# Patient Record
Sex: FEMALE | Race: ASIAN | Employment: UNEMPLOYED | URBAN - METROPOLITAN AREA
[De-identification: names, ages, dates, MRNs, and addresses within clinical notes are randomized per-mention and may not be internally consistent; named-entity substitution may affect disease eponyms.]

---

## 2017-08-04 PROCEDURE — 82607 VITAMIN B-12: CPT | Performed by: INTERNAL MEDICINE

## 2017-08-04 PROCEDURE — 88175 CYTOPATH C/V AUTO FLUID REDO: CPT | Performed by: INTERNAL MEDICINE

## 2018-01-09 PROCEDURE — 84238 ASSAY NONENDOCRINE RECEPTOR: CPT | Performed by: OTHER

## 2018-01-09 PROCEDURE — 83519 RIA NONANTIBODY: CPT | Performed by: OTHER

## 2018-01-09 PROCEDURE — 36415 COLL VENOUS BLD VENIPUNCTURE: CPT | Performed by: OTHER

## 2018-01-09 PROCEDURE — 86255 FLUORESCENT ANTIBODY SCREEN: CPT | Performed by: OTHER

## 2019-01-03 PROCEDURE — 87177 OVA AND PARASITES SMEARS: CPT | Performed by: INTERNAL MEDICINE

## 2019-01-03 PROCEDURE — 87329 GIARDIA AG IA: CPT | Performed by: INTERNAL MEDICINE

## 2019-01-03 PROCEDURE — 87209 SMEAR COMPLEX STAIN: CPT | Performed by: INTERNAL MEDICINE

## 2019-01-03 PROCEDURE — 87272 CRYPTOSPORIDIUM AG IF: CPT | Performed by: INTERNAL MEDICINE

## 2020-01-08 ENCOUNTER — OFFICE VISIT (OUTPATIENT)
Dept: OBGYN CLINIC | Facility: CLINIC | Age: 50
End: 2020-01-08
Payer: COMMERCIAL

## 2020-01-08 VITALS
WEIGHT: 156 LBS | HEIGHT: 63 IN | BODY MASS INDEX: 27.64 KG/M2 | DIASTOLIC BLOOD PRESSURE: 68 MMHG | SYSTOLIC BLOOD PRESSURE: 114 MMHG

## 2020-01-08 DIAGNOSIS — N94.11 SUPERFICIAL DYSPAREUNIA: Primary | ICD-10-CM

## 2020-01-08 DIAGNOSIS — N95.1 PERIMENOPAUSE: ICD-10-CM

## 2020-01-08 DIAGNOSIS — N92.6 IRREGULAR MENSES: ICD-10-CM

## 2020-01-08 DIAGNOSIS — Z12.4 CERVICAL CANCER SCREENING: ICD-10-CM

## 2020-01-08 PROCEDURE — 99203 OFFICE O/P NEW LOW 30 MIN: CPT | Performed by: NURSE PRACTITIONER

## 2020-01-08 NOTE — PROGRESS NOTES
Here for new gynecology visit. 52year old G 4 P 2. Patient's last menstrual period was 07/01/2019 (approximate). .     Here to inquire about a 2 year history of irregular menses and vaginal dryness leading to painful intercourse.  She started to see her m past.  Heart:  No chest pain, palpitations. Breasts:  No pain, lumps or secretions. GI:   No nausea, emesis, reflux, liver disease, GB problems, issues with diarrhea or constipation.   :   No urgency, frequency, VICTOR M, bladder problems in past.  Extremiti

## 2020-01-10 LAB — HPV MRNA E6/E7: NOT DETECTED

## 2020-01-27 ENCOUNTER — TELEPHONE (OUTPATIENT)
Dept: OBGYN CLINIC | Facility: CLINIC | Age: 50
End: 2020-01-27

## 2020-01-27 DIAGNOSIS — Z13.220 ENCOUNTER FOR SCREENING FOR LIPID DISORDER: ICD-10-CM

## 2020-01-27 DIAGNOSIS — N95.1 PERIMENOPAUSE: ICD-10-CM

## 2020-01-27 DIAGNOSIS — N92.6 IRREGULAR MENSES: Primary | ICD-10-CM

## 2020-01-27 NOTE — TELEPHONE ENCOUNTER
[facility] lab calling requesting orders that were placed by Eddie Bradley on 1/8 to [facility] to be reordered to [facility] lab. [facility] lab unable to see [facility] orders in [employer]. Pt is at [facility] lab now. Orders re entered.

## 2020-01-28 ENCOUNTER — APPOINTMENT (OUTPATIENT)
Dept: GENERAL RADIOLOGY | Facility: HOSPITAL | Age: 50
End: 2020-01-28
Attending: EMERGENCY MEDICINE
Payer: COMMERCIAL

## 2020-01-28 ENCOUNTER — TELEPHONE (OUTPATIENT)
Dept: OBGYN CLINIC | Facility: CLINIC | Age: 50
End: 2020-01-28

## 2020-01-28 ENCOUNTER — HOSPITAL ENCOUNTER (OUTPATIENT)
Facility: HOSPITAL | Age: 50
Setting detail: OBSERVATION
Discharge: HOME | End: 2020-01-29
Attending: EMERGENCY MEDICINE
Payer: COMMERCIAL

## 2020-01-28 DIAGNOSIS — R07.9 CHEST PAIN OF UNCERTAIN ETIOLOGY: Primary | ICD-10-CM

## 2020-01-28 PROBLEM — R79.89 AZOTEMIA: Status: ACTIVE | Noted: 2020-01-28

## 2020-01-28 PROBLEM — R73.9 HYPERGLYCEMIA: Status: ACTIVE | Noted: 2020-01-28

## 2020-01-28 LAB
ATRIAL RATE: 86 BPM
D-DIMER: 0.28 UG/ML FEU (ref ?–0.5)
GLUCOSE BLD-MCNC: 133 MG/DL (ref 70–99)
P AXIS: 55 DEGREES
P-R INTERVAL: 144 MS
Q-T INTERVAL: 376 MS
QRS DURATION: 86 MS
QTC CALCULATION (BEZET): 449 MS
R AXIS: 14 DEGREES
T AXIS: 55 DEGREES
TROPONIN I SERPL-MCNC: <0.045 NG/ML (ref ?–0.04)
VENTRICULAR RATE: 86 BPM

## 2020-01-28 PROCEDURE — 71045 X-RAY EXAM CHEST 1 VIEW: CPT | Performed by: EMERGENCY MEDICINE

## 2020-01-28 PROCEDURE — 99285 EMERGENCY DEPT VISIT HI MDM: CPT

## 2020-01-28 PROCEDURE — 84484 ASSAY OF TROPONIN QUANT: CPT | Performed by: HOSPITALIST

## 2020-01-28 PROCEDURE — 82962 GLUCOSE BLOOD TEST: CPT

## 2020-01-28 PROCEDURE — 93010 ELECTROCARDIOGRAM REPORT: CPT

## 2020-01-28 PROCEDURE — 36415 COLL VENOUS BLD VENIPUNCTURE: CPT

## 2020-01-28 PROCEDURE — 93005 ELECTROCARDIOGRAM TRACING: CPT

## 2020-01-28 PROCEDURE — 84484 ASSAY OF TROPONIN QUANT: CPT | Performed by: EMERGENCY MEDICINE

## 2020-01-28 PROCEDURE — 85379 FIBRIN DEGRADATION QUANT: CPT | Performed by: EMERGENCY MEDICINE

## 2020-01-28 RX ORDER — HEPARIN SODIUM 5000 [USP'U]/ML
5000 INJECTION, SOLUTION INTRAVENOUS; SUBCUTANEOUS EVERY 8 HOURS SCHEDULED
Status: DISCONTINUED | OUTPATIENT
Start: 2020-01-28 | End: 2020-01-29

## 2020-01-28 RX ORDER — METFORMIN HYDROCHLORIDE 1000 MG/1
1000 TABLET, FILM COATED, EXTENDED RELEASE ORAL NIGHTLY
COMMUNITY
End: 2020-01-31

## 2020-01-28 RX ORDER — ATORVASTATIN CALCIUM 20 MG/1
20 TABLET, FILM COATED ORAL NIGHTLY
Status: DISCONTINUED | OUTPATIENT
Start: 2020-01-28 | End: 2020-01-29

## 2020-01-28 RX ORDER — DEXTROSE MONOHYDRATE 25 G/50ML
50 INJECTION, SOLUTION INTRAVENOUS
Status: DISCONTINUED | OUTPATIENT
Start: 2020-01-28 | End: 2020-01-29

## 2020-01-28 NOTE — TELEPHONE ENCOUNTER
Labs indicate likely menopause. She will need to go 1 full year without a menses to say she is in menopause. She should use back up contraception until then.

## 2020-01-28 NOTE — TELEPHONE ENCOUNTER
Pt advised results are not yet available. Will contact pt when results are reviewed by provider. Patient verbalized understanding.

## 2020-01-28 NOTE — ED INITIAL ASSESSMENT (HPI)
Pt to ED from [facility] for chest pain for past 2 weeks. Pt describes pain as burning sensation, intermittent.  Pt received 324 ASA and 1 nitro tab prior to arrival. Pt reports she is pain free upon arrival.

## 2020-01-29 ENCOUNTER — APPOINTMENT (OUTPATIENT)
Dept: CV DIAGNOSTICS | Facility: HOSPITAL | Age: 50
End: 2020-01-29
Attending: HOSPITALIST
Payer: COMMERCIAL

## 2020-01-29 ENCOUNTER — APPOINTMENT (OUTPATIENT)
Dept: CV DIAGNOSTICS | Facility: HOSPITAL | Age: 50
End: 2020-01-29
Attending: PHYSICIAN ASSISTANT
Payer: COMMERCIAL

## 2020-01-29 VITALS
SYSTOLIC BLOOD PRESSURE: 116 MMHG | BODY MASS INDEX: 26.95 KG/M2 | DIASTOLIC BLOOD PRESSURE: 81 MMHG | WEIGHT: 152.13 LBS | TEMPERATURE: 99 F | OXYGEN SATURATION: 99 % | HEART RATE: 93 BPM | RESPIRATION RATE: 18 BRPM | HEIGHT: 63 IN

## 2020-01-29 LAB
GLUCOSE BLD-MCNC: 115 MG/DL (ref 70–99)
GLUCOSE BLD-MCNC: 137 MG/DL (ref 70–99)
TROPONIN I SERPL-MCNC: <0.045 NG/ML (ref ?–0.04)
TROPONIN I SERPL-MCNC: <0.045 NG/ML (ref ?–0.04)

## 2020-01-29 PROCEDURE — 90471 IMMUNIZATION ADMIN: CPT

## 2020-01-29 PROCEDURE — 78452 HT MUSCLE IMAGE SPECT MULT: CPT | Performed by: PHYSICIAN ASSISTANT

## 2020-01-29 PROCEDURE — 93018 CV STRESS TEST I&R ONLY: CPT | Performed by: PHYSICIAN ASSISTANT

## 2020-01-29 PROCEDURE — 84484 ASSAY OF TROPONIN QUANT: CPT | Performed by: HOSPITALIST

## 2020-01-29 PROCEDURE — 93306 TTE W/DOPPLER COMPLETE: CPT | Performed by: HOSPITALIST

## 2020-01-29 PROCEDURE — 82962 GLUCOSE BLOOD TEST: CPT

## 2020-01-29 PROCEDURE — 94150 VITAL CAPACITY TEST: CPT

## 2020-01-29 PROCEDURE — 93017 CV STRESS TEST TRACING ONLY: CPT | Performed by: PHYSICIAN ASSISTANT

## 2020-01-29 RX ORDER — PANTOPRAZOLE SODIUM 40 MG/1
40 TABLET, DELAYED RELEASE ORAL DAILY
Qty: 30 TABLET | Refills: 0 | Status: SHIPPED | OUTPATIENT
Start: 2020-01-29 | End: 2020-02-03

## 2020-01-29 RX ORDER — PYRIDOSTIGMINE BROMIDE 60 MG/1
90 TABLET ORAL EVERY 8 HOURS SCHEDULED
Qty: 63 TABLET | Refills: 0 | Status: SHIPPED | OUTPATIENT
Start: 2020-01-29 | End: 2020-01-31

## 2020-01-29 RX ORDER — PYRIDOSTIGMINE BROMIDE 60 MG/1
90 TABLET ORAL EVERY 8 HOURS SCHEDULED
Status: DISCONTINUED | OUTPATIENT
Start: 2020-01-29 | End: 2020-01-29

## 2020-01-29 NOTE — PLAN OF CARE
Pt has no complaints of pain or malaise. Pt does report some cardiac discomfort that goes away after a few seconds. Pt states this discomfort is not happening as often. No neuro symptoms present. Pt is NSR on the monitor.  Abdomen is soft, non-tender, with for signs of decreased coronary artery perfusion - ex.  Angina  - Evaluate fluid balance, assess for edema, trend weights  Outcome: Progressing  Goal: Absence of cardiac arrhythmias or at baseline  Description  INTERVENTIONS:  - Continuous cardiac monitorin

## 2020-01-29 NOTE — PROGRESS NOTES
01/29/20 1235   Spontaneous Parameters   $ Spontaneous Vital Capacity 2050   Negative Inspiratory Force -30

## 2020-01-29 NOTE — PLAN OF CARE
Preliminary stress test results per Dr. Katie Spence: EF 78% with normal perfusion. Echo report reviewed with normal EF and no valvulopathy. Discussed with Dr. Patito Valdes, stable for discharge from cardiology standpoint.  Recommend PPI and follow up with PCP i

## 2020-01-29 NOTE — CONSULTS
[facility]  Consultation Note    Margoth Montanez Patient Status:  Observation    1970 MRN [id number]   [facility] 8NE-A Attending Melanie Roldan,*   Hosp Day # 0 PCP Mercedes Willson MD     Reason for cons She reports that she does not drink alcohol or use drugs.     Allergies:  No Known Allergies    Home Medications:  metFORMIN HCl ER, MOD, 1000 MG (MOD) Oral Tablet 24 Hr, Take 1,000 mg by mouth nightly.  , Disp: , Rfl: , 1/27/2020 at 2000  SITagliptin Phosp Lili Comer MD on 1/28/2020 at 19:40     Approved by: Lili Comer MD on 1/28/2020 at 19:40           No previous echocardiogram    EKG: NSR, PRWP no change when compared to 2014    Assessment / Plan:      Alejandro Dodd is a 52year old female with

## 2020-01-29 NOTE — PLAN OF CARE
Pt. Alert and o x 4 , on RA , resp. Pattern easy and non labored , denies any SOB or CP at this time. Troponin - x 2. Tele shows NSR on the monitor. POC discussed with pt. And spouse.  Instructed to call staff when needed help , placed call light monitoring, monitor vital signs, obtain 12 lead EKG if indicated  - Evaluate effectiveness of antiarrhythmic and heart rate control medications as ordered  - Initiate emergency measures for life threatening arrhythmias  - Monitor electrolytes and administe

## 2020-01-29 NOTE — H&P
[facility] Hospitalist History and Physical      CC: chest pain    PCP: Daniel Martin MD    History of Present Illness: Patient is a 52year old female with PMH sig for myesthesia gravis and DM here with intermittent left sided chest pain for the last 10 days.  Sh MOD, 1000 MG (MOD) Oral Tablet 24 Hr, TAKE 1 TABLET DAILY WITH BREAKFAST (DUE FOR APPOINTMENT), Disp: 90 tablet, Rfl: 0          Current Meds:  Scheduled:   • atorvastatin  20 mg Oral Nightly   • Heparin Sodium (Porcine)  5,000 Units Subcutaneous [facility] K 4.00   CL 99*   CO2 27.1         Radiology:    I independently reviewed the following studies from admit date:     CXR:  Other studies reviewed:      Assessment/Plan:   Principal Problem:    Chest pain of uncertain etiology  Active Problems:    Azotemi

## 2020-01-29 NOTE — PLAN OF CARE
New admission for left sided chest pain. Patient denies any chest pain at this time. Normal sinus on tele. Room air. Pt refusing subq heparin.  at bedside. QID accucheck. Cardiac consult notified. No orders at this time. Will continue to monitor.

## 2020-01-29 NOTE — ED PROVIDER NOTES
Patient Seen in: [facility] 8ne-a      History   Patient presents with:  Chest Pain Angina    Stated Complaint: chest pain    HPI    Patient is a 41-year-old female who presently lives in [facility]. Patient traveled from [place] [place] [facility] last month.   Patient over Resp 16   Temp 97.7 °F (36.5 °C)   Temp src    SpO2 97 %   O2 Device None (Room air)       Current:/80   Pulse 76   Temp 97.7 °F (36.5 °C)   Resp 26   Ht 160 cm (5' 3\")   Wt 70 kg   LMP 05/28/2019 (Approximate)   SpO2 99%   BMI 27.34 kg/m² presentation. EKG    Rate, intervals and axes as noted on EKG Report. Rate: 86  Rhythm: Sinus Rhythm  Reading: Normal sinus rhythm, no acute changes         EKG from immediate care did show T inversions in V1 V2 which appear to be new.      Chest x-r

## 2020-01-29 NOTE — PLAN OF CARE
Problem: Diabetes/Glucose Control  Goal: Glucose maintained within prescribed range  Description  INTERVENTIONS:  - Monitor Blood Glucose as ordered  - Assess for signs and symptoms of hyperglycemia and hypoglycemia  - Administer ordered medications to m Discharge  1/29/2020 1005 by Roger Tse, RN  Outcome: Progressing  Goal: Absence of cardiac arrhythmias or at baseline  Description  INTERVENTIONS:  - Continuous cardiac monitoring, monitor vital signs, obtain 12 lead EKG if indicated  - Evaluate effec

## 2020-01-30 NOTE — PROGRESS NOTES
NURSING DISCHARGE NOTE  Pt discharged home. IV removed. Tele dc'd and returned to monitor tech. F/U instructions provided and discussed. New medications re: protonix and mestinon discussed. Pt and family verbalized understanding. RX is given.  Discussed adv

## 2020-02-07 NOTE — TELEPHONE ENCOUNTER
Patient's spouse was given results (on FYI). Verbalized understanding. No further questions or concerns.